# Patient Record
Sex: MALE | Race: WHITE | URBAN - METROPOLITAN AREA
[De-identification: names, ages, dates, MRNs, and addresses within clinical notes are randomized per-mention and may not be internally consistent; named-entity substitution may affect disease eponyms.]

---

## 2022-10-20 ENCOUNTER — HOSPITAL ENCOUNTER (OUTPATIENT)
Dept: INTERVENTIONAL RADIOLOGY/VASCULAR | Age: 32
Discharge: HOME | End: 2022-10-20
Payer: MEDICARE

## 2022-10-20 VITALS — BODY MASS INDEX: 29.96 KG/M2 | WEIGHT: 214 LBS | HEIGHT: 71 IN | TEMPERATURE: 98.3 F

## 2022-10-20 DIAGNOSIS — S06.0X1D CONCUSSION WITH LOSS OF CONSCIOUSNESS OF 30 MINUTES OR LESS, SUBSEQUENT ENCOUNTER: ICD-10-CM

## 2022-10-20 LAB
ANION GAP SERPL CALCULATED.3IONS-SCNC: 8 MMOL/L (ref 3–16)
BUN BLDV-MCNC: 12 MG/DL (ref 7–20)
CALCIUM SERPL-MCNC: 9.5 MG/DL (ref 8.3–10.6)
CHLORIDE BLD-SCNC: 102 MMOL/L (ref 99–110)
CO2: 27 MMOL/L (ref 21–32)
CREAT SERPL-MCNC: 1.1 MG/DL (ref 0.9–1.3)
GFR SERPL CREATININE-BSD FRML MDRD: >60 ML/MIN/{1.73_M2}
GLUCOSE BLD-MCNC: 112 MG/DL (ref 70–99)
HCT VFR BLD CALC: 42.1 % (ref 40.5–52.5)
HEMOGLOBIN: 14.2 G/DL (ref 13.5–17.5)
INR BLD: 1.01 (ref 0.87–1.14)
MCH RBC QN AUTO: 27.7 PG (ref 26–34)
MCHC RBC AUTO-ENTMCNC: 33.8 G/DL (ref 31–36)
MCV RBC AUTO: 81.9 FL (ref 80–100)
PDW BLD-RTO: 13.4 % (ref 12.4–15.4)
PLATELET # BLD: 218 K/UL (ref 135–450)
PMV BLD AUTO: 9.7 FL (ref 5–10.5)
POTASSIUM SERPL-SCNC: 3.7 MMOL/L (ref 3.5–5.1)
PROTHROMBIN TIME: 13.3 SEC (ref 11.7–14.5)
RBC # BLD: 5.14 M/UL (ref 4.2–5.9)
SODIUM BLD-SCNC: 137 MMOL/L (ref 136–145)
WBC # BLD: 5.6 K/UL (ref 4–11)

## 2022-10-20 PROCEDURE — 85027 COMPLETE CBC AUTOMATED: CPT

## 2022-10-20 PROCEDURE — 6360000004 HC RX CONTRAST MEDICATION: Performed by: NEUROLOGICAL SURGERY

## 2022-10-20 PROCEDURE — 99152 MOD SED SAME PHYS/QHP 5/>YRS: CPT

## 2022-10-20 PROCEDURE — 36226 PLACE CATH VERTEBRAL ART: CPT

## 2022-10-20 PROCEDURE — C1894 INTRO/SHEATH, NON-LASER: HCPCS

## 2022-10-20 PROCEDURE — 85610 PROTHROMBIN TIME: CPT

## 2022-10-20 PROCEDURE — C1769 GUIDE WIRE: HCPCS

## 2022-10-20 PROCEDURE — 36224 PLACE CATH CAROTD ART: CPT

## 2022-10-20 PROCEDURE — 76377 3D RENDER W/INTRP POSTPROCES: CPT

## 2022-10-20 PROCEDURE — C1887 CATHETER, GUIDING: HCPCS

## 2022-10-20 PROCEDURE — C1760 CLOSURE DEV, VASC: HCPCS

## 2022-10-20 PROCEDURE — 80048 BASIC METABOLIC PNL TOTAL CA: CPT

## 2022-10-20 PROCEDURE — 99153 MOD SED SAME PHYS/QHP EA: CPT

## 2022-10-20 RX ORDER — SODIUM CHLORIDE AND POTASSIUM CHLORIDE .9; .15 G/100ML; G/100ML
SOLUTION INTRAVENOUS CONTINUOUS
Status: DISPENSED | OUTPATIENT
Start: 2022-10-20 | End: 2022-10-20

## 2022-10-20 RX ORDER — IODIXANOL 320 MG/ML
100 INJECTION, SOLUTION INTRAVASCULAR
Status: COMPLETED | OUTPATIENT
Start: 2022-10-20 | End: 2022-10-20

## 2022-10-20 RX ORDER — SODIUM CHLORIDE 9 MG/ML
INJECTION, SOLUTION INTRAVENOUS PRN
Status: DISCONTINUED | OUTPATIENT
Start: 2022-10-20 | End: 2022-10-21 | Stop reason: HOSPADM

## 2022-10-20 RX ORDER — ONDANSETRON 2 MG/ML
4 INJECTION INTRAMUSCULAR; INTRAVENOUS EVERY 6 HOURS PRN
Status: DISCONTINUED | OUTPATIENT
Start: 2022-10-20 | End: 2022-10-21 | Stop reason: HOSPADM

## 2022-10-20 RX ORDER — ONDANSETRON 4 MG/1
4 TABLET, ORALLY DISINTEGRATING ORAL EVERY 8 HOURS PRN
Status: DISCONTINUED | OUTPATIENT
Start: 2022-10-20 | End: 2022-10-21 | Stop reason: HOSPADM

## 2022-10-20 RX ORDER — SODIUM CHLORIDE 0.9 % (FLUSH) 0.9 %
5-40 SYRINGE (ML) INJECTION PRN
Status: DISCONTINUED | OUTPATIENT
Start: 2022-10-20 | End: 2022-10-21 | Stop reason: HOSPADM

## 2022-10-20 RX ORDER — ACETAMINOPHEN 325 MG/1
650 TABLET ORAL EVERY 4 HOURS PRN
Status: DISCONTINUED | OUTPATIENT
Start: 2022-10-20 | End: 2022-10-21 | Stop reason: HOSPADM

## 2022-10-20 RX ORDER — SODIUM CHLORIDE 0.9 % (FLUSH) 0.9 %
5-40 SYRINGE (ML) INJECTION EVERY 12 HOURS SCHEDULED
Status: DISCONTINUED | OUTPATIENT
Start: 2022-10-20 | End: 2022-10-21 | Stop reason: HOSPADM

## 2022-10-20 RX ADMIN — IODIXANOL 100 ML: 320 INJECTION, SOLUTION INTRAVASCULAR at 09:18
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
## 2022-10-20 NOTE — PRE SEDATION
AnSedation Pre-Procedure Note    Patient Name: Prasanth Luis   YOB: 1990  Room/Bed: Room/bed info not found  Medical Record Number: [id number]  Date: 10/20/2022   Time: 8:34 AM       Indication:  Angiogream    Consent: I have discussed with the patient and/or the patient representative the indication, alternatives, and the possible risks and/or complications of the planned procedure and the anesthesia methods. The patient and/or patient representative appear to understand and agree to proceed. Vital Signs:   Vitals:    10/20/22 0718   Temp: 98.3 °F (36.8 °C)       Past Medical History:   has no past medical history on file. Past Surgical History:   has no past surgical history on file. Medications:   Scheduled Meds:   Continuous Infusions:   PRN Meds:   Home Meds:   Prior to Admission medications    Not on File     Coumadin Use Last 7 Days:  no  Antiplatelet drug therapy use last 7 days: no  Other anticoagulant use last 7 days: no  Additional Medication Information:  NA      Pre-Sedation Documentation and Exam:   I have personally completed a history, physical exam & review of systems for this patient (see notes).   Temp 98.3 °F (36.8 °C) (Oral)   Ht 5' 11\" (1.803 m)   Wt 214 lb (97.1 kg)   BMI 29.85 kg/m²       Mallampati Airway Assessment:  Mallampati Class II - (soft palate, fauces & uvula are visible)    Prior History of Anesthesia Complications:   none    ASA Classification:  Class 2 - A normal healthy patient with mild systemic disease    Sedation/ Anesthesia Plan:   intravenous sedation    Medications Planned:   midazolam (Versed) intravenously and fentanyl intravenously    Patient is an appropriate candidate for plan of sedation: yes    Electronically signed by Edin Coulter MD on 10/20/2022 at 8:34 AM
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
## 2022-10-20 NOTE — DISCHARGE INSTRUCTIONS
[facility]  Cardiovascular Special Procedures  Angiogram Catheterization  Patient Discharge Instructions         Day 1 (Procedure Day):  Rest for the remainder of the day. Minimal stair climbing, if you must use stairs, lead with your unaffected leg. Do not drive a car. Do not be alone. Avoid prolonged sitting, walk around occasionally until bedtime tonight. Leave dressing intact. Day 2:  You may climb stairs, begin slowly  You may drive a car, unless otherwise directed by physician. Remove dressing. You may bathe/shower, but wash gently around the puncture site and pat dry. Place new band-aid on site daily until skin heals. Things to Avoid:  You may not do any strenuous exercises for one week. (ex: golfing, bowling, tennis, vacuum, snow removal, jogging, and aerobic exercises). No hot tubs, baths, or pools for one week. Do not lift anything over 10 pounds for 10 days. Avoid positions that would put pressure on your groin. Like sitting upright in a straight back chair. No lotions, powders, or ointments near site for 5 days. Things to watch for:  You may have a small lump or a bruise. This is common and will go away. If the lump increases and site becomes painful, call physician immediately. If mild discomfort occurs at the puncture site you may place an ice pack on the site at 15 minute intervals. If the puncture site starts to bleed, immediately lie on a hard flat surface and apply pressure just above the puncture site. Have someone call 911 and maintain pressure until the EMS arrives. If you have loss of color, extreme coldness or numbness of the leg, call 911 immediately. Excessive pain of the groin, thigh or calf, call your physician immediately. Watch for signs and symptoms of an infection at the groin site (fever, local pain, redness, warmth or discharge from the puncture site), call your physician immediately if any develop.       Any Questions please call us at [phone] (between 7am- 5pm, Mon-Fri). After hours you should contact your physician. [facility]  Cardiovascular Special Procedures  General Discharge Instructions    PROCEDURE: cerebral angiogram    __X__ You may be drowsy or lightheaded after receiving sedation. DO NOT operate a vehicle (automobile, bicycle, motorcycle, machinery, or power tools), make any important decisions or sign any important/legal documents, or drink alcoholic beverages for the next 24 hours  __X__ We strongly suggest that a responsible adult be with you for the next 24 hours for your protection and safety  ____ If the intravenous catheter site is painful, apply warm wet compresses on the site until the soreness is relieved and elevate the arm above the heart. Call your physician if no improvement in 2 to 3 days    DIETARY INSTRUCTIONS:    ____ Drink extra fluids over the next 24 hours (If not contraindicated by illness or by physician order)  ____ Start with clear liquids and progress to normal diet as you feel like eating. If you experience nausea or repeated episodes of vomiting, which persist beyond 12-24 hours, notify your doctor        __X__ Resume your previous diet      MEDICATION INSTRUCTIONS:    ____ See Medication Reconciliation Sheet        FOLLOW-UP APPOINTMENT    Follow up with MD in as directed. Belongings returned to patient and/or family: Yes. The Discharge Instructions have been explained to me. I understand and can verbalize these instructions.